# Patient Record
Sex: MALE | Race: OTHER | URBAN - METROPOLITAN AREA
[De-identification: names, ages, dates, MRNs, and addresses within clinical notes are randomized per-mention and may not be internally consistent; named-entity substitution may affect disease eponyms.]

---
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
## 2023-12-26 NOTE — ED PROVIDER NOTE - PRINCIPAL DIAGNOSIS
Requested Prescriptions     Pending Prescriptions Disp Refills    potassium chloride (KLOR-CON M) 10 MEQ extended release tablet [Pharmacy Med Name: Potassium Chloride 10mEq Extended-Release Tablet] 180 tablet 0     Sig: Take 1 tablet by mouth twice daily. tamsulosin (FLOMAX) 0.4 MG capsule [Pharmacy Med Name: Tamsulosin Hydrochloride 0.4mg Capsule] 90 capsule 0     Sig: Take 1 capsule by mouth nightly at bedtime.        Next appt is 1/18/2024  Last appt was 12/18/2023 Injury of head, initial encounter 06[birth year]May-1990
